# Patient Record
Sex: MALE | Race: ASIAN | NOT HISPANIC OR LATINO | Employment: FULL TIME | URBAN - METROPOLITAN AREA
[De-identification: names, ages, dates, MRNs, and addresses within clinical notes are randomized per-mention and may not be internally consistent; named-entity substitution may affect disease eponyms.]

---

## 2018-07-14 ENCOUNTER — HOSPITAL ENCOUNTER (EMERGENCY)
Facility: MEDICAL CENTER | Age: 63
End: 2018-07-14
Attending: EMERGENCY MEDICINE
Payer: COMMERCIAL

## 2018-07-14 VITALS
DIASTOLIC BLOOD PRESSURE: 78 MMHG | WEIGHT: 147.71 LBS | SYSTOLIC BLOOD PRESSURE: 125 MMHG | RESPIRATION RATE: 17 BRPM | OXYGEN SATURATION: 98 % | TEMPERATURE: 98.7 F | HEART RATE: 85 BPM | HEIGHT: 67 IN | BODY MASS INDEX: 23.18 KG/M2

## 2018-07-14 DIAGNOSIS — S61.011A LACERATION OF RIGHT THUMB WITHOUT FOREIGN BODY WITHOUT DAMAGE TO NAIL, INITIAL ENCOUNTER: ICD-10-CM

## 2018-07-14 PROCEDURE — 700111 HCHG RX REV CODE 636 W/ 250 OVERRIDE (IP): Performed by: EMERGENCY MEDICINE

## 2018-07-14 PROCEDURE — 304999 HCHG REPAIR-SIMPLE/INTERMED LEVEL 1

## 2018-07-14 PROCEDURE — 90715 TDAP VACCINE 7 YRS/> IM: CPT | Performed by: EMERGENCY MEDICINE

## 2018-07-14 PROCEDURE — 99283 EMERGENCY DEPT VISIT LOW MDM: CPT

## 2018-07-14 PROCEDURE — 303353 HCHG DERMABOND SKIN ADHESIVE

## 2018-07-14 PROCEDURE — 90471 IMMUNIZATION ADMIN: CPT

## 2018-07-14 PROCEDURE — 304217 HCHG IRRIGATION SYSTEM

## 2018-07-14 RX ORDER — ASPIRIN 81 MG/1
81 TABLET, CHEWABLE ORAL DAILY
COMMUNITY

## 2018-07-14 RX ADMIN — CLOSTRIDIUM TETANI TOXOID ANTIGEN (FORMALDEHYDE INACTIVATED), CORYNEBACTERIUM DIPHTHERIAE TOXOID ANTIGEN (FORMALDEHYDE INACTIVATED), BORDETELLA PERTUSSIS TOXOID ANTIGEN (GLUTARALDEHYDE INACTIVATED), BORDETELLA PERTUSSIS FILAMENTOUS HEMAGGLUTININ ANTIGEN (FORMALDEHYDE INACTIVATED), BORDETELLA PERTUSSIS PERTACTIN ANTIGEN, AND BORDETELLA PERTUSSIS FIMBRIAE 2/3 ANTIGEN 0.5 ML: 5; 2; 2.5; 5; 3; 5 INJECTION, SUSPENSION INTRAMUSCULAR at 18:19

## 2018-07-14 ASSESSMENT — PAIN SCALES - GENERAL: PAINLEVEL_OUTOF10: 3

## 2018-07-15 NOTE — ED NOTES
Pt dc'd home with instructions for wound care, encouraged to f/u with pcp, opportunity provided to ask questions, pt ambulated out of ed with steady gait with family.

## 2018-07-15 NOTE — DISCHARGE INSTRUCTIONS
Return if you have increasing pain, redness, or fever.   Fingertip Injuries and Amputations  Fingertip injuries are common and often get injured because they are last to escape when pulling your hand out of harm's way. You have amputated (cut off) part of your finger. How this turns out depends largely on how much was amputated. If just the tip is amputated, often the end of the finger will grow back and the finger may return to much the same as it was before the injury.   If more of the finger is missing, your caregiver has done the best with the tissue remaining to allow you to keep as much finger as is possible. Your caregiver after checking your injury has tried to leave you with a painless fingertip that has durable, feeling skin. If possible, your caregiver has tried to maintain the finger's length and appearance and preserve its fingernail.   Please read the instructions outlined below and refer to this sheet in the next few weeks. These instructions provide you with general information on caring for yourself. Your caregiver may also give you specific instructions. While your treatment has been done according to the most current medical practices available, unavoidable complications occasionally occur. If you have any problems or questions after discharge, please call your caregiver.  HOME CARE INSTRUCTIONS   · You may resume normal diet and activities as directed or allowed.  · Keep your hand elevated above the level of your heart. This helps decrease pain and swelling.  · Keep ice packs (or a bag of ice wrapped in a towel) on the injured area for 15-20 minutes, 3-4 times per day, for the first two days.  · Change dressings if necessary or as directed.  · Clean the wound daily or as directed.  · Only take over-the-counter or prescription medicines for pain, discomfort, or fever as directed by your caregiver.  · Keep appointments as directed.  SEEK IMMEDIATE MEDICAL CARE IF:  · You develop redness, swelling,  numbness or increasing pain in the wound.  · There is pus coming from the wound.  · You develop an unexplained oral temperature above 102° F (38.9° C) or as your caregiver suggests.  · There is a foul (bad) smell coming from the wound or dressing.  · There is a breaking open of the wound (edges not staying together) after sutures or staples have been removed.  MAKE SURE YOU:   · Understand these instructions.  · Will watch your condition.  · Will get help right away if you are not doing well or get worse.  Document Released: 11/08/2006 Document Revised: 03/11/2013 Document Reviewed: 10/07/2009  [employer]® Patient Information ©2014 [employer]

## 2018-07-15 NOTE — ED NOTES
Pt presents to the ER after rt thumb avulsion while he was using a mandolin. Pt unable to get the bleeding to stop.

## 2018-07-15 NOTE — ED NOTES
Presents with a C/O an accidental right thumb laceration caused by a metal blade. He might require a booster Tdap.

## 2018-07-15 NOTE — ED PROVIDER NOTES
"ED Provider Note    CHIEF COMPLAINT  Chief Complaint   Patient presents with   • Laceration       HPI  Don Kirkland is a 63 y.o. male who presents laceration to the distal right thumb.  Patient states he was using a slicing cooking device and his hand slipped causing him to slice his finger.  He does not know when his last tetanus shot was.  He denies any numbness or tingling.  He states he tried placing a Band-Aid on it but it continued to bleed for over an hour and a half.    REVIEW OF SYSTEMS  Pertinent positives include bleeding, laceration. Pertinent negatives include numbness, tingling      PAST MEDICAL HISTORY       SOCIAL HISTORY  Social History     Social History Main Topics   • Smoking status: Never Smoker   • Smokeless tobacco: Never Used   • Alcohol use No   • Drug use: No   • Sexual activity: Not on file       SURGICAL HISTORY  patient denies any surgical history    CURRENT MEDICATIONS  Home Medications     Reviewed by Moy Hui R.N. (Registered Nurse) on 07/14/18 at 4064  Med List Status: Partial   Medication Last Dose Status   aspirin (ASA) 81 MG Chew Tab chewable tablet 7/14/2018 Active                ALLERGIES  No Known Allergies    PHYSICAL EXAM  VITAL SIGNS: /78   Pulse 85   Temp 37.1 °C (98.7 °F)   Resp 17   Ht 1.702 m (5' 7\") Comment: Stated  Wt 67 kg (147 lb 11.3 oz)   SpO2 98%   BMI 23.13 kg/m²   Constitutional: Well developed, Well nourished, no acute distress.   HENT: Normocephalic, Atraumatic,   Eyes: Conjunctiva normal, No discharge.   Musculoskeletal: Examination of the right thumb.  Patient has a 1 cm by half centimeter avulsion type laceration to the radial aspect of his right distal thumb.  Just lateral to the nail.  It does not involve the nail.  Bleeding is continuous.  Is able to fully flex and extend the thumb without difficulty.  Does denies any numbness or tingly  Skin: Warm, Dry, No erythema, No rash.  Laceration as described above  Neurologic: Alert & oriented " x 3, Normal motor function,  No focal deficits noted.   Psychiatric: Affect normal, Judgment normal, Mood normal.     Medications given in the ER  Medications   tetanus-dipth-acell pertussis (ADACEL) inj 0.5 mL (0.5 mL Intramuscular Given 7/14/18 1819)       COURSE & MEDICAL DECISION MAKING  Pertinent Labs & Imaging studies reviewed. (See chart for details)  Patient's tetanus shot was updated.    Laceration Repair Procedure    Indication: Laceration    Location/Description: Right thumb    Procedure: The patient was placed in the appropriate position.  Bleeding was controlled with a thumb tourniquet.  A sterile rubber glove and a rubber band was used.  To obtain hemostasis.  The area was then cleansed using betadine and irrigated with normal saline. The laceration was closed with Dermabond. There were no additional lacerations requiring repair. The wound area was then dressed with a bandage.      Total repaired wound length: 1 cm.     Other Items: None    The patient tolerated the procedure well.    Complications: None     The patient will return for new or worsening symptoms and is stable at the time of discharge.    The patient is referred to a primary physician for blood pressure management, diabetic screening, and for all other preventative health concerns.        DISPOSITION:  Patient will be discharged home in stable condition.    FOLLOW UP:  Your Physician  Varies    Schedule an appointment as soon as possible for a visit in 1 week      Jimbo Xie M.D.  [address] [id number] [phone]      If you need a doctor    [address] [id number] [phone]    If you need a doctor    [address] [address]  [phone]    If you need a doctor      OUTPATIENT MEDICATIONS:  Discharge Medication List as of 7/14/2018  7:14 PM            FINAL IMPRESSION  1. Laceration of right thumb without foreign body without  damage to nail, initial encounter               Electronically signed by: Kota Sanchez, 7/14/2018 5:53 PM    This record was made with a voice recognition software. The software is not perfect. I have tried to correct any grammar, spelling or context errors to the best of my ability, but errors may still remain. Interpretation of this chart should be taken in this context.